# Patient Record
Sex: MALE | Race: BLACK OR AFRICAN AMERICAN
[De-identification: names, ages, dates, MRNs, and addresses within clinical notes are randomized per-mention and may not be internally consistent; named-entity substitution may affect disease eponyms.]

---

## 2019-01-04 ENCOUNTER — HOSPITAL ENCOUNTER (EMERGENCY)
Dept: HOSPITAL 97 - ER | Age: 64
Discharge: HOME | End: 2019-01-04
Payer: COMMERCIAL

## 2019-01-04 DIAGNOSIS — I10: ICD-10-CM

## 2019-01-04 DIAGNOSIS — J10.1: Primary | ICD-10-CM

## 2019-01-04 PROCEDURE — 87804 INFLUENZA ASSAY W/OPTIC: CPT

## 2019-01-04 PROCEDURE — 71046 X-RAY EXAM CHEST 2 VIEWS: CPT

## 2019-01-04 PROCEDURE — 99284 EMERGENCY DEPT VISIT MOD MDM: CPT

## 2019-01-04 NOTE — RAD REPORT
EXAM DESCRIPTION:  Yuli Aceves (2 Views)1/4/2019 2:38 pm

 

CLINICAL HISTORY:  Cough

 

COMPARISON:  None

 

FINDINGS:   The lungs appear clear of acute infiltrate. The heart is normal size

 

IMPRESSION:   No acute abnormalities displayed

## 2019-01-04 NOTE — ER
Nurse's Notes                                                                                     

 [facility]                                                                

Name: Jam Peterson                                                                              

Age: 63 yrs                                                                                       

Sex: Male                                                                                         

: 1955                                                                                   

MRN: [id number]                                                                                   

Arrival Date: 2019                                                                          

Time: 12:49                                                                                       

Account#: [id number]                                                                            

Bed 21                                                                                            

Private MD: Ren Funez                                                                         

Diagnosis: Influenza due to identified novel influenza A virus                                    

                                                                                                  

Presentation:                                                                                     

                                                                                             

13:01 Presenting complaint: Patient states: fever, nasal congestion, started 2 days ago;      hj  

      100.2; mucinex taken today; denies sore throat;. Transition of care: patient was not        

      received from another setting of care. Onset of symptoms was 2019. Risk         

      Assessment: Do you want to hurt yourself or someone else? Patient reports no desire to      

      harm self or others. Initial Sepsis Screen: Does the patient meet any 2 criteria? No.       

      Patient's initial sepsis screen is negative. Does the patient have a suspected source       

      of infection? No. Patient's initial sepsis screen is negative. Care prior to arrival:       

      None.                                                                                       

13:01 Method Of Arrival: Ambulatory                                                             

13:01 Acuity: TONY 4                                                                           hj  

                                                                                                  

Triage Assessment:                                                                                

13:03 General: Appears in no apparent distress. uncomfortable, Behavior is calm, cooperative, hj  

      appropriate for age. Pain: Denies pain.                                                     

                                                                                                  

Historical:                                                                                       

- Allergies:                                                                                      

13:03 No Known Allergies;                                                                     hj  

- Home Meds:                                                                                      

13:03 BP meds [Active];                                                                       hj  

- PMHx:                                                                                           

13:03 Hypertension;                                                                           hj  

- PSHx:                                                                                           

13:03 None;                                                                                   hj  

                                                                                                  

- Immunization history:: Adult Immunizations not up to date.                                      

- Social history:: Smoking status: Patient/guardian denies using tobacco, Patient uses            

  alcohol.                                                                                        

- Ebola Screening: : Patient negative for fever greater than or equal to 101.5 degrees            

  Fahrenheit, and additional compatible Ebola Virus Disease symptoms Patient denies               

  exposure to infectious person Patient denies travel to an Ebola-affected area in the            

  21 days before illness onset.                                                                   

                                                                                                  

                                                                                                  

Screenin:03 Abuse screen: Denies threats or abuse. Denies injuries from another. Nutritional        hj  

      screening: No deficits noted. Tuberculosis screening: No symptoms or risk factors           

      identified. Fall Risk None identified.                                                      

                                                                                                  

Vital Signs:                                                                                      

13:04  / 88; Pulse 96; Resp 18; Temp 99.7(O); Pulse Ox 99% on R/A; Weight 68.04 kg;     hj  

      Height 5 ft. 9 in. (175.26 cm); Pain 0/10;                                                  

13:04 Body Mass Index 22.15 (68.04 kg, 175.26 cm)                                             hj  

                                                                                                  

ED Course:                                                                                        

12:49 Patient arrived in ED.                                                                  mr  

12:49 Ren Funez MD is Private Physician.                                                 mr  

13:02 Triage completed.                                                                       hj  

13:03 Arm band placed on right wrist.                                                         hj  

13:03 Patient has correct armband on for positive identification. Bed in low position. Call   hj  

      light in reach. Side rails up X 1. Adult w/ patient.                                        

13:37 Flu Sent.                                                                               hj  

13:59 Lester Jones, RN is Primary Nurse.                                                    hj  

14:03 Naveed Lloyd PA is PHCP.                                                               [id number] 

14:03 Walter Waldrop MD is Attending Physician.                                              [id number] 

14:35 Patient moved to radiology via wheelchair.                                              jb2 

14:38 X-ray completed. Patient tolerated procedure well. Patient moved back from radiology.   ka  

14:39 XRAY Chest Pa And Lat (2 Views) In Process Unspecified.                                 EDMS

14:49 Ren Funez MD is Referral Physician.                                                [id number] 

14:56 No provider procedures requiring assistance completed. Patient did not have IV access   hj  

      during this emergency room visit.                                                           

                                                                                                  

Administered Medications:                                                                         

14:05 Drug: Tamiflu 75 mg Route: PO;                                                          hj  

14:09 Follow up: Response: No adverse reaction                                                hj  

                                                                                                  

                                                                                                  

Outcome:                                                                                          

14:49 Discharge ordered by MD.                                                                [id number] 

14:57 Discharged to home ambulatory, with family.                                             hj  

14:57 Condition: stable                                                                           

14:57 Discharge instructions given to patient, family, Instructed on discharge instructions,      

      follow up and referral plans. medication usage, Demonstrated understanding of               

      instructions, follow-up care, medications, Prescriptions given X 4.                         

14:57 Patient left the ED.                                                                    hj  

                                                                                                  

Signatures:                                                                                       

Dispatcher [employer]                           [facility]                                                 

Miko Aaron Engle                              jb2                                                  

Naveed Lloyd PA                        PA   [id number]                                                  

Lester Jones, EDITA                      RN                                                      

Della Schuler                                                   

                                                                                                  

Corrections: (The following items were deleted from the chart)                                    

13:08 13:04 Pulse 96bpm; Resp 18bpm; Pulse Ox 99% RA; Temp 99.7F Oral; 68.04 kg; Height 5 ft. hj  

      9 in.; BMI: 22.1; Pain 0/10; hj                                                             

                                                                                                  

**************************************************************************************************

## 2019-01-04 NOTE — EDPHYS
Physician Documentation                                                                           

 [facility]                                                                

Name: Jam Peterson                                                                              

Age: 63 yrs                                                                                       

Sex: Male                                                                                         

: 1955                                                                                   

MRN: [id number]                                                                                   

Arrival Date: 2019                                                                          

Time: 12:49                                                                                       

Account#: [id number]                                                                            

Bed 21                                                                                            

Private MD: Ren Funez                                                                         

ED Physician Walter Waldrop                                                                       

HPI:                                                                                              

                                                                                             

14:37 This 63 yrs old Black Male presents to ER via Ambulatory with complaints of Flu         jr8 

      Symptoms.                                                                                   

14:37 flu symptoms that began 2 days ago. Onset: The symptoms/episode began/occurred acutely, jr8 

      2 day(s) ago. Severity of symptoms: At their worst the symptoms were mild in the            

      emergency department the symptoms are unchanged. The patient has not experienced            

      similar symptoms in the past. The patient has not recently seen a physician.                

                                                                                                  

Historical:                                                                                       

- Allergies:                                                                                      

13:03 No Known Allergies;                                                                     hj  

- Home Meds:                                                                                      

13:03 BP meds [Active];                                                                       hj  

- PMHx:                                                                                           

13:03 Hypertension;                                                                           hj  

- PSHx:                                                                                           

13:03 None;                                                                                   hj  

                                                                                                  

- Immunization history:: Adult Immunizations not up to date.                                      

- Social history:: Smoking status: Patient/guardian denies using tobacco, Patient uses            

  alcohol.                                                                                        

- Ebola Screening: : Patient negative for fever greater than or equal to 101.5 degrees            

  Fahrenheit, and additional compatible Ebola Virus Disease symptoms Patient denies               

  exposure to infectious person Patient denies travel to an Ebola-affected area in the            

  21 days before illness onset.                                                                   

                                                                                                  

                                                                                                  

ROS:                                                                                              

14:37 Eyes: Negative for injury, pain, redness, and discharge, Neck: Negative for injury,     jr8 

      pain, and swelling, Cardiovascular: Negative for chest pain, palpitations, and edema,       

      Abdomen/GI: Negative for abdominal pain, nausea, vomiting, diarrhea, and constipation,      

      Back: Negative for injury and pain, MS/Extremity: Negative for injury and deformity,        

      Skin: Negative for injury, rash, and discoloration, Neuro: Negative for headache,           

      weakness, numbness, tingling, and seizure.                                                  

14:37 Constitutional: Positive for body aches, chills, fever.                                     

14:37 ENT: Positive for rhinorrhea, sinus congestion, Negative for sore throat.                   

14:37 Respiratory: Positive for cough, Negative for dyspnea on exertion, shortness of breath,     

      sputum production, wheezing.                                                                

                                                                                                  

Exam:                                                                                             

14:37 Eyes:  Pupils equal round and reactive to light, extra-ocular motions intact.  Lids and jr8 

      lashes normal.  Conjunctiva and sclera are non-icteric and not injected.  Cornea within     

      normal limits.  Periorbital areas with no swelling, redness, or edema. ENT:  Nares          

      patent. No nasal discharge, no septal abnormalities noted.  Tympanic membranes are          

      normal and external auditory canals are clear.  Oropharynx with no redness, swelling,       

      or masses, exudates, or evidence of obstruction, uvula midline.  Mucous membranes           

      moist. Neck:  Trachea midline, no thyromegaly or masses palpated, and no cervical           

      lymphadenopathy.  Supple, full range of motion without nuchal rigidity, or vertebral        

      point tenderness.  No Meningismus. Cardiovascular:  Regular rate and rhythm with a          

      normal S1 and S2.  No gallops, murmurs, or rubs.  Normal PMI, no JVD.  No pulse             

      deficits. Abdomen/GI:  Soft, non-tender, with normal bowel sounds.  No distension or        

      tympany.  No guarding or rebound.  No evidence of tenderness throughout. Back:  No          

      spinal tenderness.  No costovertebral tenderness.  Full range of motion. Skin:  Warm,       

      dry with normal turgor.  Normal color with no rashes, no lesions, and no evidence of        

      cellulitis. MS/ Extremity:  Pulses equal, no cyanosis.  Neurovascular intact.  Full,        

      normal range of motion. Neuro:  Awake and alert, GCS 15, oriented to person, place,         

      time, and situation.  Cranial nerves II-XII grossly intact.  Motor strength 5/5 in all      

      extremities.  Sensory grossly intact.  Cerebellar exam normal.  Normal gait.                

14:37 Respiratory: the patient does not display signs of respiratory distress,  Respirations:     

      normal, symetrical, no use of accessory muscles, no grunting, no evidence of nasal          

      flaring, no prolonged exhalations, no pursed lip breathing, no retractions, no shallow      

      respirations, no splinting, no tachypnea, Breath sounds: rales, that are mild, are          

      heard in the  left posterior lower lobe.                                                    

                                                                                                  

Vital Signs:                                                                                      

13:04  / 88; Pulse 96; Resp 18; Temp 99.7(O); Pulse Ox 99% on R/A; Weight 68.04 kg;     hj  

      Height 5 ft. 9 in. (175.26 cm); Pain 0/10;                                                  

13:04 Body Mass Index 22.15 (68.04 kg, 175.26 cm)                                               

                                                                                                  

MDM:                                                                                              

14:03 Patient medically screened.                                                             jr8 

14:48 Data reviewed: vital signs, nurses notes, lab test result(s), Flu: positive radiologic  jr8 

      studies, plain films. Data interpreted: Pulse oximetry: on room air is 99 %.                

      Interpretation: normal. Counseling: I had a detailed discussion with the patient and/or     

      guardian regarding: the historical points, exam findings, and any diagnostic results        

      supporting the discharge/admit diagnosis, lab results, radiology results, the need for      

      outpatient follow up, a family practitioner, to return to the emergency department if       

      symptoms worsen or persist or if there are any questions or concerns that arise at home.    

                                                                                                  

                                                                                             

13:05 Order name: Flu; Complete Time: 14:04                                                     

                                                                                             

14:12 Order name: XRAY Chest Pa And Lat (2 Views); Complete Time: 14:48                       jr8 

                                                                                                  

Administered Medications:                                                                         

14:05 Drug: Tamiflu 75 mg Route: PO;                                                            

14:09 Follow up: Response: No adverse reaction                                                  

                                                                                                  

                                                                                                  

Disposition:                                                                                      

19 14:49 Discharged to Home. Impression: Influenza due to identified novel influenza A      

  virus.                                                                                          

- Condition is Stable.                                                                            

- Discharge Instructions: Influenza, Adult.                                                       

- Prescriptions for Tamiflu 75 mg Oral Capsule - take 1 capsule by ORAL route every 12            

  hours for 5 days; 10 capsule. Tessalon Perles 100 mg Oral Capsule - take 1 capsule by           

  ORAL route every 8 hours As needed; 15 capsule. Albuterol Sulfate 90 mcg/actuation -            

  inhale 1-2 puff by INHALATION route every 4-6 hours; 1 Inhaler. Guaifenesin AC 10-              

  100 mg/5 mL Oral Liquid - take 10 milliliter by ORAL route every 4 hours As needed;             

  240 milliliter.                                                                                 

- Medication Reconciliation Form, Thank You Letter, Antibiotic Education, Prescription            

  Opioid Use form.                                                                                

- Follow up: Ren Funez MD; When: 5 - 6 days; Reason: Recheck today's complaints,             

  Continuance of care, Re-evaluation by your physician.                                           

- Problem is new.                                                                                 

- Symptoms have improved.                                                                         

                                                                                                  

                                                                                                  

                                                                                                  

Addendum:                                                                                         

2019                                                                                        

     07:29 Co-signature as Attending Physician, Walter Waldrop MD I agree with the assessment and   k
dr

           plan of care.                                                                          

                                                                                                  

Signatures:                                                                                       

Dispatcher [employer]                           EDMS                                                 

Walter Waldrop MD MD   [facility]                                                  

Roszak, Naveed, COURTNEY VALDEZ   jr8                                                  

Lester Jones RN                      RN   hj                                                   

                                                                                                  

Corrections: (The following items were deleted from the chart)                                    

                                                                                             

14:57 14:49 2019 14:49 Discharged to Home. Impression: Influenza due to identified      hj  

      novel influenza A virus. Condition is Stable. Forms are Medication Reconciliation Form,     

      Thank You Letter, Antibiotic Education, Prescription Opioid Use. Follow up: Ren Funez; When: 5 - 6 days; Reason: Recheck today's complaints, Continuance of care,           

      Re-evaluation by your physician. Problem is new. Symptoms have improved. jr8                

                                                                                                  

**************************************************************************************************